# Patient Record
Sex: MALE | Race: ASIAN | NOT HISPANIC OR LATINO | URBAN - METROPOLITAN AREA
[De-identification: names, ages, dates, MRNs, and addresses within clinical notes are randomized per-mention and may not be internally consistent; named-entity substitution may affect disease eponyms.]

---

## 2019-06-05 ENCOUNTER — OUTPATIENT (OUTPATIENT)
Dept: INPATIENT UNIT | Facility: HOSPITAL | Age: 74
LOS: 1 days | Discharge: HOME | End: 2019-06-05
Payer: COMMERCIAL

## 2019-06-05 DIAGNOSIS — R00.2 PALPITATIONS: ICD-10-CM

## 2019-06-05 LAB — GLUCOSE BLDC GLUCOMTR-MCNC: 116 MG/DL — HIGH (ref 70–99)

## 2019-06-05 PROCEDURE — 33285 INSJ SUBQ CAR RHYTHM MNTR: CPT

## 2019-06-05 RX ORDER — SODIUM CHLORIDE 9 MG/ML
3 INJECTION INTRAMUSCULAR; INTRAVENOUS; SUBCUTANEOUS EVERY 8 HOURS
Refills: 0 | Status: DISCONTINUED | OUTPATIENT
Start: 2019-06-05 | End: 2019-06-20

## 2019-06-05 RX ORDER — LISINOPRIL 2.5 MG/1
1 TABLET ORAL
Qty: 0 | Refills: 0 | DISCHARGE

## 2019-06-05 RX ORDER — ASPIRIN/CALCIUM CARB/MAGNESIUM 324 MG
81 TABLET ORAL ONCE
Refills: 0 | Status: DISCONTINUED | OUTPATIENT
Start: 2019-06-05 | End: 2019-06-20

## 2019-06-05 RX ORDER — ATORVASTATIN CALCIUM 80 MG/1
1 TABLET, FILM COATED ORAL
Qty: 0 | Refills: 0 | DISCHARGE

## 2019-06-05 RX ORDER — METFORMIN HYDROCHLORIDE 850 MG/1
1 TABLET ORAL
Qty: 0 | Refills: 0 | DISCHARGE

## 2019-06-05 RX ORDER — ASPIRIN/CALCIUM CARB/MAGNESIUM 324 MG
1 TABLET ORAL
Qty: 0 | Refills: 0 | DISCHARGE

## 2019-06-05 NOTE — H&P CARDIOLOGY - REVIEW OF SYSTEMS
The patient denies chest pain, SOB, palpitations, dizziness, presyncope, syncope, b/l lower extremities edema, abdominal pain, N/V/D/C, melena, hematochezia, h/o DVT, PE, AILYN, fever, chills, urinary symptoms, hematuria, recent travel, sick contacts.

## 2019-06-05 NOTE — CHART NOTE - NSCHARTNOTEFT_GEN_A_CORE
Type of Procedure: Implantable Loop Recorder Implant  Licensed independent practitioner: Brennan Cespedes MD  Assistant: none  Description of procedure: sterile conditions, antibiotic coverage, ILR implanted 4th ICS left parasternal area  Findings of procedure: Normal sensing  Estimated blood loss: < 10 cc  Specimen removed: none  Preoperative Dx: cryptogenic stroke  Postoperative Dx: cryptogenic stroke  Complications: none  Anesthesia type: local anesthesia    Brennan Cespedes MD

## 2019-06-05 NOTE — H&P CARDIOLOGY - PMH
Cerebrovascular accident (CVA), unspecified mechanism  5/29/19, no residuals  HLD (Hyperlipidemia)    Hypertension, unspecified type Cerebrovascular accident (CVA), unspecified mechanism  5/29/19, L sided weakness  HLD (Hyperlipidemia)    Hypertension, unspecified type

## 2019-06-05 NOTE — CHART NOTE - NSCHARTNOTEFT_GEN_A_CORE
Patient is s/p ILR implantation. Device teaching given to patient and family and they demonstrate understanding of the instructions. F/U appointment with device clinic in 2-3 weeks.

## 2019-06-05 NOTE — H&P CARDIOLOGY - HISTORY OF PRESENT ILLNESS
74 y.o. male with PMH of HTN, DM II, Hyperchol, CVA with L sided weakness on 4/29 presents today for elective ILR implant.  The patient was seen by his cardiologist today , recommended to have ILR to r/o possible arrhythmia. The patient denies chest pain, SOB, palpitations, dizziness, presyncope, syncope, b/l lower extremities edema, abdominal pain, N/V/D/C, melena, hematochezia, h/o DVT, PE, AILYN, fever, chills, urinary symptoms, hematuria, recent travel, sick contacts.

## 2019-06-21 ENCOUNTER — APPOINTMENT (OUTPATIENT)
Dept: ELECTROPHYSIOLOGY | Facility: CLINIC | Age: 74
End: 2019-06-21
Payer: MEDICARE

## 2019-06-21 DIAGNOSIS — Z86.39 PERSONAL HISTORY OF OTHER ENDOCRINE, NUTRITIONAL AND METABOLIC DISEASE: ICD-10-CM

## 2019-06-21 PROCEDURE — 93285 PRGRMG DEV EVAL SCRMS IP: CPT

## 2019-06-21 RX ORDER — ASPIRIN 81 MG/1
81 TABLET, CHEWABLE ORAL
Refills: 0 | Status: ACTIVE | COMMUNITY

## 2019-06-21 RX ORDER — ATORVASTATIN CALCIUM 20 MG/1
20 TABLET, FILM COATED ORAL
Refills: 0 | Status: ACTIVE | COMMUNITY

## 2019-06-21 RX ORDER — PIOGLITAZONE HYDROCHLORIDE AND METFORMIN HYDROCHLORIDE 15; 850 MG/1; MG/1
15-850 TABLET, FILM COATED ORAL DAILY
Refills: 0 | Status: ACTIVE | COMMUNITY

## 2019-06-21 RX ORDER — LISINOPRIL 10 MG/1
10 TABLET ORAL DAILY
Refills: 0 | Status: ACTIVE | COMMUNITY

## 2019-06-21 RX ORDER — METFORMIN HYDROCHLORIDE 500 MG/1
500 TABLET, COATED ORAL DAILY
Refills: 0 | Status: DISCONTINUED | COMMUNITY
End: 2019-06-21

## 2020-05-01 DIAGNOSIS — I47.2 VENTRICULAR TACHYCARDIA: ICD-10-CM

## 2020-05-26 ENCOUNTER — RX CHANGE (OUTPATIENT)
Age: 75
End: 2020-05-26

## 2021-03-24 ENCOUNTER — NON-APPOINTMENT (OUTPATIENT)
Age: 76
End: 2021-03-24

## 2021-12-02 PROBLEM — I63.9 CEREBRAL INFARCTION, UNSPECIFIED: Chronic | Status: ACTIVE | Noted: 2019-06-05

## 2021-12-02 PROBLEM — I10 ESSENTIAL (PRIMARY) HYPERTENSION: Chronic | Status: ACTIVE | Noted: 2019-06-05

## 2021-12-03 ENCOUNTER — APPOINTMENT (OUTPATIENT)
Dept: ELECTROPHYSIOLOGY | Facility: CLINIC | Age: 76
End: 2021-12-03
Payer: COMMERCIAL

## 2021-12-03 ENCOUNTER — NON-APPOINTMENT (OUTPATIENT)
Age: 76
End: 2021-12-03

## 2021-12-03 VITALS
BODY MASS INDEX: 32.82 KG/M2 | HEIGHT: 67 IN | OXYGEN SATURATION: 100 % | WEIGHT: 209.13 LBS | SYSTOLIC BLOOD PRESSURE: 175 MMHG | DIASTOLIC BLOOD PRESSURE: 84 MMHG | HEART RATE: 65 BPM

## 2021-12-03 PROCEDURE — 93000 ELECTROCARDIOGRAM COMPLETE: CPT

## 2021-12-03 PROCEDURE — 99214 OFFICE O/P EST MOD 30 MIN: CPT

## 2021-12-03 NOTE — REASON FOR VISIT
[Follow-Up - Clinic] : a clinic follow-up of [FreeTextEntry3] : Normal MD Fransisca [FreeTextEntry2] : HOCM and PVCs

## 2021-12-03 NOTE — CARDIOLOGY SUMMARY
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 \par 12/3/2021- NSR with incomplete HB, RBB with hr of 65bpm.  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 [de-identified] : ILR in 2019

## 2021-12-03 NOTE — DISCUSSION/SUMMARY
[FreeTextEntry1] : In summary Reece Trejo is a 68 year old with a history of HTN and apical variant hypertrophic cardiomyopathy and now he is here for follow-up for frequent PVCs, fatigue, NSVT and apical variant HCM.   \par \par # NSVT/apical variant HCM: no MRI since 2013.  Will assess for development of LGE (ordered MRI).  Also patient more fatigued which could be due to frequent PVCs.  Recommend mexiletine 150 mg PO BID and continue metoprolol.  Also discussed ablation of the PVCs. \par \par #HTN: Resume oral antihypertensives as prescribed. Encouraged heart healthy diet, sodium restriction, and weight loss. Continue regular follow ups with Cardiologist for further HTN management.  \par \par #HLD: Resume statin therapy as prescribed and regular follow up with Cardiologist for routine lipid monitoring and management. \par \par If you should have any further questions, please call me. \par \par Sincerely,\par \par Brennan Cespedes MD

## 2021-12-03 NOTE — PHYSICAL EXAM
[General Appearance - Well Developed] : well developed [Normal Appearance] : normal appearance [Well Groomed] : well groomed [General Appearance - Well Nourished] : well nourished [No Deformities] : no deformities [General Appearance - In No Acute Distress] : no acute distress [Eyelids - No Xanthelasma] : the eyelids demonstrated no xanthelasmas [Normal Oral Mucosa] : normal oral mucosa [No Oral Pallor] : no oral pallor [No Oral Cyanosis] : no oral cyanosis [Normal Jugular Venous A Waves Present] : normal jugular venous A waves present [Normal Jugular Venous V Waves Present] : normal jugular venous V waves present [No Jugular Venous Escobar A Waves] : no jugular venous escobar A waves [Respiration, Rhythm And Depth] : normal respiratory rhythm and effort [Exaggerated Use Of Accessory Muscles For Inspiration] : no accessory muscle use [Auscultation Breath Sounds / Voice Sounds] : lungs were clear to auscultation bilaterally [Heart Rate And Rhythm] : heart rate and rhythm were normal [Heart Sounds] : normal S1 and S2 [Murmurs] : no murmurs present [Abdomen Soft] : soft [Abdomen Tenderness] : non-tender [Abdomen Mass (___ Cm)] : no abdominal mass palpated [Abnormal Walk] : normal gait [Gait - Sufficient For Exercise Testing] : the gait was sufficient for exercise testing [Nail Clubbing] : no clubbing of the fingernails [Cyanosis, Localized] : no localized cyanosis [Petechial Hemorrhages (___cm)] : no petechial hemorrhages [Skin Color & Pigmentation] : normal skin color and pigmentation [] : no rash [No Venous Stasis] : no venous stasis [Skin Lesions] : no skin lesions [No Skin Ulcers] : no skin ulcer [No Xanthoma] : no  xanthoma was observed [Oriented To Time, Place, And Person] : oriented to person, place, and time [Affect] : the affect was normal [Mood] : the mood was normal [No Anxiety] : not feeling anxious [Well Developed] : well developed [Well Nourished] : well nourished [No Acute Distress] : no acute distress [Normal Conjunctiva] : normal conjunctiva [Normal Venous Pressure] : normal venous pressure [No Carotid Bruit] : no carotid bruit [Normal S1, S2] : normal S1, S2 [No Murmur] : no murmur [No Rub] : no rub [No Gallop] : no gallop [Clear Lung Fields] : clear lung fields [Good Air Entry] : good air entry [No Respiratory Distress] : no respiratory distress  [Soft] : abdomen soft [Non Tender] : non-tender [No Masses/organomegaly] : no masses/organomegaly [Normal Bowel Sounds] : normal bowel sounds [Normal Gait] : normal gait [No Edema] : no edema [No Cyanosis] : no cyanosis [No Clubbing] : no clubbing [No Varicosities] : no varicosities [No Rash] : no rash [No Skin Lesions] : no skin lesions [Moves all extremities] : moves all extremities [No Focal Deficits] : no focal deficits [Normal Speech] : normal speech [Alert and Oriented] : alert and oriented [Normal memory] : normal memory [FreeTextEntry1] : 1+ ankle edema

## 2021-12-03 NOTE — HISTORY OF PRESENT ILLNESS
[FreeTextEntry1] : Mich Gunderson MD\par \par Dear Mich,\par \par I saw Reece Trejo on 12/3/2021.  As you know he is a 76 year old with a history of HTN and possible apical variant hypertrophic cardiomyopathy and now he is here for follow-up because a nuclears stress test showed a reduction in EF.  A follow-up echocardiogram today showed that the EF was normal and there could be apical variant hypertrophic cardiomyopathy, but there also could be heavy apical trabeculation.  An MRI was advised. He has not had recent chest pain, dyspnea, palpitations, lightheadedness, syncope, fatigue.  There was no evidence of LORENZA or NOMAN on the echocardiogram and no gradient across the LVOT. \par \par Pt was at Dr. Gongora's office on 12/1/21 and heart rate as low as 44, EKG performed that day showing 68 bpm. Pt endorses hr has been low the last 2 weeks with associated fatigue, dizziness/lightheadedness, lack of energy. He is usually a very active person and these symptoms force him to lay down and relax more frequently than usual.  \par \par Denies chest pain, shortness of breath, dyspnea on exertion, syncope and falls.

## 2021-12-06 ENCOUNTER — NON-APPOINTMENT (OUTPATIENT)
Age: 76
End: 2021-12-06

## 2021-12-09 ENCOUNTER — NON-APPOINTMENT (OUTPATIENT)
Age: 76
End: 2021-12-09

## 2021-12-13 ENCOUNTER — OUTPATIENT (OUTPATIENT)
Dept: OUTPATIENT SERVICES | Facility: HOSPITAL | Age: 76
LOS: 1 days | End: 2021-12-13
Payer: COMMERCIAL

## 2021-12-13 ENCOUNTER — APPOINTMENT (OUTPATIENT)
Dept: MRI IMAGING | Facility: CLINIC | Age: 76
End: 2021-12-13
Payer: COMMERCIAL

## 2021-12-13 ENCOUNTER — RESULT REVIEW (OUTPATIENT)
Age: 76
End: 2021-12-13

## 2021-12-13 DIAGNOSIS — I47.2 VENTRICULAR TACHYCARDIA: ICD-10-CM

## 2021-12-13 PROCEDURE — 75561 CARDIAC MRI FOR MORPH W/DYE: CPT | Mod: 26,MH

## 2021-12-13 PROCEDURE — 75561 CARDIAC MRI FOR MORPH W/DYE: CPT

## 2021-12-13 PROCEDURE — 75565 CARD MRI VELOC FLOW MAPPING: CPT | Mod: 26,MH

## 2021-12-13 PROCEDURE — 75565 CARD MRI VELOC FLOW MAPPING: CPT

## 2021-12-13 PROCEDURE — A9585: CPT

## 2021-12-14 ENCOUNTER — NON-APPOINTMENT (OUTPATIENT)
Age: 76
End: 2021-12-14

## 2021-12-14 ENCOUNTER — TRANSCRIPTION ENCOUNTER (OUTPATIENT)
Age: 76
End: 2021-12-14

## 2021-12-16 ENCOUNTER — NON-APPOINTMENT (OUTPATIENT)
Age: 76
End: 2021-12-16

## 2022-02-03 ENCOUNTER — RX CHANGE (OUTPATIENT)
Age: 77
End: 2022-02-03

## 2022-02-07 ENCOUNTER — RX CHANGE (OUTPATIENT)
Age: 77
End: 2022-02-07

## 2022-03-14 ENCOUNTER — RX CHANGE (OUTPATIENT)
Age: 77
End: 2022-03-14

## 2022-03-14 RX ORDER — MEXILETINE HYDROCHLORIDE 150 MG/1
150 CAPSULE ORAL
Qty: 180 | Refills: 3 | Status: DISCONTINUED | COMMUNITY
Start: 2021-12-03 | End: 2022-03-14

## 2023-08-01 ENCOUNTER — APPOINTMENT (OUTPATIENT)
Dept: ELECTROPHYSIOLOGY | Facility: CLINIC | Age: 78
End: 2023-08-01
Payer: MEDICARE

## 2023-08-01 ENCOUNTER — NON-APPOINTMENT (OUTPATIENT)
Age: 78
End: 2023-08-01

## 2023-08-01 VITALS
HEIGHT: 67 IN | OXYGEN SATURATION: 97 % | HEART RATE: 65 BPM | SYSTOLIC BLOOD PRESSURE: 132 MMHG | DIASTOLIC BLOOD PRESSURE: 71 MMHG | WEIGHT: 185 LBS | BODY MASS INDEX: 29.03 KG/M2

## 2023-08-01 DIAGNOSIS — I10 ESSENTIAL (PRIMARY) HYPERTENSION: ICD-10-CM

## 2023-08-01 DIAGNOSIS — Z95.818 PRESENCE OF OTHER CARDIAC IMPLANTS AND GRAFTS: ICD-10-CM

## 2023-08-01 DIAGNOSIS — I63.9 CEREBRAL INFARCTION, UNSPECIFIED: ICD-10-CM

## 2023-08-01 PROCEDURE — 93000 ELECTROCARDIOGRAM COMPLETE: CPT

## 2023-08-01 PROCEDURE — 99213 OFFICE O/P EST LOW 20 MIN: CPT

## 2023-08-01 RX ORDER — MEXILETINE HYDROCHLORIDE 150 MG/1
150 CAPSULE ORAL
Qty: 180 | Refills: 3 | Status: DISCONTINUED | COMMUNITY
Start: 2022-03-14 | End: 2023-08-01

## 2023-08-01 NOTE — DISCUSSION/SUMMARY
[FreeTextEntry1] : In summary Reece Trejo is a 78 year old with a history of HTN and apical variant hypertrophic cardiomyopathy and now he is here for considering   1. ILR at EOS: EKG performed today to assess for conduction disease and reveals SR.  Loop reached JAQUELINE since MARCH 2023 and he prefers to leave the Loop in. Loop has not revealed any events or episodes recently and he remains asymptomatic. LGE was about 5% and patient is 78 years old.   2.HTN: Resume oral antihypertensives as prescribed. Encouraged heart healthy diet, sodium restriction, and weight loss. Continue regular follow ups with Cardiologist for further HTN management.    3.HLD: Resume statin therapy as prescribed and regular follow up with Cardiologist for routine lipid monitoring and management.   4. Apical variant HCM.  Given LGE 5% on MRI, no history of sustained VT/VF, age of 78, LV wall thickness << 30 mm, no history of true syncope, patient not an good candidate for ICD.   If you should have any further questions, please call me.   Sincerely,  Brennan Cespedes MD [EKG obtained to assist in diagnosis and management of assessed problem(s)] : EKG obtained to assist in diagnosis and management of assessed problem(s)

## 2023-08-01 NOTE — REASON FOR VISIT
[Follow-Up - Clinic] : a clinic follow-up of [FreeTextEntry3] : Normal MD Jalyn [FreeTextEntry2] : HOCM and PVCs

## 2023-08-01 NOTE — CARDIOLOGY SUMMARY
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 \par  12/3/2021- NSR with incomplete HB, RBB with hr of 65bpm.  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 [de-identified] : ILR in 2019

## 2023-08-01 NOTE — PHYSICAL EXAM
[Well Developed] : well developed [Well Nourished] : well nourished [No Acute Distress] : no acute distress [Normal Venous Pressure] : normal venous pressure [No Carotid Bruit] : no carotid bruit [Normal S1, S2] : normal S1, S2 [No Murmur] : no murmur [No Rub] : no rub [No Gallop] : no gallop [Clear Lung Fields] : clear lung fields [Good Air Entry] : good air entry [No Respiratory Distress] : no respiratory distress  [Soft] : abdomen soft [Non Tender] : non-tender [No Masses/organomegaly] : no masses/organomegaly [Normal Bowel Sounds] : normal bowel sounds [Normal Gait] : normal gait [No Edema] : no edema [No Cyanosis] : no cyanosis [No Clubbing] : no clubbing [No Varicosities] : no varicosities [No Rash] : no rash [No Skin Lesions] : no skin lesions [Moves all extremities] : moves all extremities [No Focal Deficits] : no focal deficits [Normal Speech] : normal speech [Alert and Oriented] : alert and oriented [Normal memory] : normal memory [General Appearance - Well Developed] : well developed [Normal Appearance] : normal appearance [Well Groomed] : well groomed [General Appearance - Well Nourished] : well nourished [No Deformities] : no deformities [General Appearance - In No Acute Distress] : no acute distress [Normal Conjunctiva] : the conjunctiva exhibited no abnormalities [Eyelids - No Xanthelasma] : the eyelids demonstrated no xanthelasmas [Normal Oral Mucosa] : normal oral mucosa [No Oral Pallor] : no oral pallor [No Oral Cyanosis] : no oral cyanosis [Normal Jugular Venous A Waves Present] : normal jugular venous A waves present [Normal Jugular Venous V Waves Present] : normal jugular venous V waves present [No Jugular Venous Escobar A Waves] : no jugular venous escobar A waves [Respiration, Rhythm And Depth] : normal respiratory rhythm and effort [Exaggerated Use Of Accessory Muscles For Inspiration] : no accessory muscle use [Auscultation Breath Sounds / Voice Sounds] : lungs were clear to auscultation bilaterally [Heart Rate And Rhythm] : heart rate and rhythm were normal [Heart Sounds] : normal S1 and S2 [Murmurs] : no murmurs present [Abdomen Soft] : soft [Abdomen Tenderness] : non-tender [Abdomen Mass (___ Cm)] : no abdominal mass palpated [Abnormal Walk] : normal gait [Gait - Sufficient For Exercise Testing] : the gait was sufficient for exercise testing [Nail Clubbing] : no clubbing of the fingernails [Cyanosis, Localized] : no localized cyanosis [Petechial Hemorrhages (___cm)] : no petechial hemorrhages [Skin Color & Pigmentation] : normal skin color and pigmentation [] : no rash [No Venous Stasis] : no venous stasis [Skin Lesions] : no skin lesions [No Skin Ulcers] : no skin ulcer [No Xanthoma] : no  xanthoma was observed [Oriented To Time, Place, And Person] : oriented to person, place, and time [Affect] : the affect was normal [Mood] : the mood was normal [No Anxiety] : not feeling anxious [Normal] : normal S1, S2, no murmur, no rub, no gallop [FreeTextEntry1] : 1+ ankle edema

## 2023-08-01 NOTE — HISTORY OF PRESENT ILLNESS
[FreeTextEntry1] : Mich Gunderson MD  Dear Mich,  I saw Reece Trejo on August 1, 2023.  As you know he is a 78-year-old with a history of HTN and apical variant hypertrophic cardiomyopathy and now he is here for follow-up for risk stratification for apical variant HCM.  He has not had recent chest pain, dyspnea, palpitations, lightheadedness, syncope, fatigue.  There was no evidence of LORENZA or NOMAN on the echocardiogram and no gradient across the LVOT.   Reports his Loop monitor reached JAQUELINE on March 16, 2023. Reports Loop monitored by Dr Gongora and was told no events or episodes noted. However, he has had NSVT in the past.  Currently not taking Mexiletine and metoprolol due to bradycardia. He is doing well, no dizziness or passing out.  Patient prefers to leave the Loop monitor in, despite battery depletion. We could not interrogate the ILR today because the device is at EOS.   Denies chest pain, shortness of breath, dyspnea on exertion, syncope and falls.

## 2025-06-24 ENCOUNTER — APPOINTMENT (OUTPATIENT)
Dept: RADIOLOGY | Facility: IMAGING CENTER | Age: 80
End: 2025-06-24

## 2025-06-24 ENCOUNTER — APPOINTMENT (OUTPATIENT)
Dept: CT IMAGING | Facility: IMAGING CENTER | Age: 80
End: 2025-06-24

## 2025-08-05 ENCOUNTER — APPOINTMENT (OUTPATIENT)
Dept: NEUROLOGY | Facility: CLINIC | Age: 80
End: 2025-08-05
Payer: MEDICARE

## 2025-08-05 DIAGNOSIS — I63.9 CEREBRAL INFARCTION, UNSPECIFIED: ICD-10-CM

## 2025-08-05 DIAGNOSIS — I67.2 CEREBRAL ATHEROSCLEROSIS: ICD-10-CM

## 2025-08-05 PROCEDURE — 99205 OFFICE O/P NEW HI 60 MIN: CPT

## 2025-08-05 RX ORDER — METFORMIN HYDROCHLORIDE 500 MG/1
500 TABLET, COATED ORAL
Refills: 0 | Status: ACTIVE | COMMUNITY